# Patient Record
Sex: FEMALE | Race: WHITE | NOT HISPANIC OR LATINO
[De-identification: names, ages, dates, MRNs, and addresses within clinical notes are randomized per-mention and may not be internally consistent; named-entity substitution may affect disease eponyms.]

---

## 2022-09-21 ENCOUNTER — NON-APPOINTMENT (OUTPATIENT)
Age: 32
End: 2022-09-21

## 2022-10-07 ENCOUNTER — APPOINTMENT (OUTPATIENT)
Dept: OTOLARYNGOLOGY | Facility: CLINIC | Age: 32
End: 2022-10-07

## 2022-10-07 VITALS — TEMPERATURE: 97.3 F | BODY MASS INDEX: 28.47 KG/M2 | WEIGHT: 145 LBS | HEIGHT: 60 IN

## 2022-10-07 PROBLEM — Z00.00 ENCOUNTER FOR PREVENTIVE HEALTH EXAMINATION: Status: ACTIVE | Noted: 2022-10-07

## 2022-10-07 PROCEDURE — 31575 DIAGNOSTIC LARYNGOSCOPY: CPT

## 2022-10-07 PROCEDURE — 99204 OFFICE O/P NEW MOD 45 MIN: CPT | Mod: 25

## 2022-10-07 NOTE — PROCEDURE
[Dysphagia] : dysphagia not clearly evaluated by indirect laryngoscopy [None] : none [Flexible Endoscope] : examined with the flexible endoscope [True Vocal Cords Erythematous] : bilateral true vocal cord edema [Arytenoid Cartilages Bilateral] : bilateral arytenoid granuloma(s) [Arytenoid Edema ___ /4] : arytenoid edema [unfilled]U/4 [Arytenoid Erythema ___ /4] : arytenoid erythema [unfilled]U/4

## 2022-10-07 NOTE — PHYSICAL EXAM
[de-identified] : right tenderness  [Normal] : mucosa is normal [Midline] : trachea located in midline position [de-identified] : edema and enlarged

## 2022-10-07 NOTE — HISTORY OF PRESENT ILLNESS
[de-identified] : 32 year old woman with right sided neck swelling for several months that is tender on palpation. There has been no change in size but has gotten more tender. Pt has migraines as well. Pt had difficulty swallowing a few weeks ago and then had fever and throat pain subsequently. Pt feels her tonsils enlarged and then was given steroids with some improvement.  [Neck Mass] : neck mass [Difficulty Swallowing] : difficulty swallowing [Neck Pain] : neck pain [Fever] : no fever [Painful Swallowing] : no painful swallowing [Fatigue] : no fatigue [Night Sweats] : no night sweats

## 2022-10-09 ENCOUNTER — APPOINTMENT (OUTPATIENT)
Dept: ULTRASOUND IMAGING | Facility: HOSPITAL | Age: 32
End: 2022-10-09

## 2022-10-09 ENCOUNTER — OUTPATIENT (OUTPATIENT)
Dept: OUTPATIENT SERVICES | Facility: HOSPITAL | Age: 32
LOS: 1 days | End: 2022-10-09
Payer: COMMERCIAL

## 2022-10-09 PROCEDURE — 76536 US EXAM OF HEAD AND NECK: CPT | Mod: 26

## 2022-10-09 PROCEDURE — 76536 US EXAM OF HEAD AND NECK: CPT

## 2022-10-11 ENCOUNTER — TRANSCRIPTION ENCOUNTER (OUTPATIENT)
Age: 32
End: 2022-10-11

## 2022-10-11 LAB
ALBUMIN SERPL ELPH-MCNC: 4.8 G/DL
ALP BLD-CCNC: 59 U/L
ALT SERPL-CCNC: 32 U/L
ANION GAP SERPL CALC-SCNC: 13 MMOL/L
AST SERPL-CCNC: 23 U/L
BASOPHILS # BLD AUTO: 0.03 K/UL
BASOPHILS NFR BLD AUTO: 0.3 %
BILIRUB SERPL-MCNC: 0.2 MG/DL
BUN SERPL-MCNC: 8 MG/DL
CALCIUM SERPL-MCNC: 9.7 MG/DL
CHLORIDE SERPL-SCNC: 103 MMOL/L
CO2 SERPL-SCNC: 24 MMOL/L
CREAT SERPL-MCNC: 0.73 MG/DL
CRP SERPL-MCNC: <3 MG/L
EGFR: 113 ML/MIN/1.73M2
EOSINOPHIL # BLD AUTO: 0.06 K/UL
EOSINOPHIL NFR BLD AUTO: 0.6 %
ERYTHROCYTE [SEDIMENTATION RATE] IN BLOOD BY WESTERGREN METHOD: 18 MM/HR
GLUCOSE SERPL-MCNC: 92 MG/DL
HCT VFR BLD CALC: 38.7 %
HGB BLD-MCNC: 13.1 G/DL
IMM GRANULOCYTES NFR BLD AUTO: 0.3 %
LYMPHOCYTES # BLD AUTO: 3.1 K/UL
LYMPHOCYTES NFR BLD AUTO: 33.3 %
MAN DIFF?: NORMAL
MCHC RBC-ENTMCNC: 32.4 PG
MCHC RBC-ENTMCNC: 33.9 GM/DL
MCV RBC AUTO: 95.8 FL
MONOCYTES # BLD AUTO: 0.7 K/UL
MONOCYTES NFR BLD AUTO: 7.5 %
NEUTROPHILS # BLD AUTO: 5.4 K/UL
NEUTROPHILS NFR BLD AUTO: 58 %
PLATELET # BLD AUTO: 335 K/UL
POTASSIUM SERPL-SCNC: 4.1 MMOL/L
PROT SERPL-MCNC: 7.2 G/DL
RBC # BLD: 4.04 M/UL
RBC # FLD: 13.7 %
SODIUM SERPL-SCNC: 140 MMOL/L
WBC # FLD AUTO: 9.32 K/UL

## 2022-10-21 ENCOUNTER — APPOINTMENT (OUTPATIENT)
Dept: OTOLARYNGOLOGY | Facility: CLINIC | Age: 32
End: 2022-10-21

## 2022-10-21 VITALS
HEART RATE: 90 BPM | BODY MASS INDEX: 28.47 KG/M2 | WEIGHT: 145 LBS | TEMPERATURE: 97.8 F | HEIGHT: 60 IN | SYSTOLIC BLOOD PRESSURE: 109 MMHG | DIASTOLIC BLOOD PRESSURE: 72 MMHG

## 2022-10-21 DIAGNOSIS — R22.1 LOCALIZED SWELLING, MASS AND LUMP, NECK: ICD-10-CM

## 2022-10-21 DIAGNOSIS — R13.10 DYSPHAGIA, UNSPECIFIED: ICD-10-CM

## 2022-10-21 PROCEDURE — 99214 OFFICE O/P EST MOD 30 MIN: CPT | Mod: 25

## 2022-10-21 PROCEDURE — 31575 DIAGNOSTIC LARYNGOSCOPY: CPT

## 2022-10-21 RX ORDER — IBUPROFEN 800 MG/1
800 TABLET, FILM COATED ORAL TWICE DAILY
Qty: 20 | Refills: 3 | Status: ACTIVE | COMMUNITY
Start: 2022-10-21 | End: 1900-01-01

## 2022-10-21 NOTE — PHYSICAL EXAM
[de-identified] : neck is severely tender  [Normal] : mucosa is normal [Midline] : trachea located in midline position [de-identified] : edema and enlarged

## 2022-10-21 NOTE — HISTORY OF PRESENT ILLNESS
[FreeTextEntry1] : Pt returns with some improvement of symptoms of difficulty in swallowing. they are intermittent and patient has some nausea. Pt has evolution in cough from dry to wet. Swelling and discomfort has not changed with abx and PPI. Labs and U/S were reviewed and were WNL and shows benign cervical adenopathy. Pt has persistent right neck pain .  ESR 18.  Pt is ebv negative.

## 2022-10-21 NOTE — ASSESSMENT
[FreeTextEntry1] : Pt has continued neck pain and swelling in the deep neck on the right side.Pt has elevated ESR.

## 2022-10-25 ENCOUNTER — NON-APPOINTMENT (OUTPATIENT)
Age: 32
End: 2022-10-25

## 2022-10-28 ENCOUNTER — EMERGENCY (EMERGENCY)
Facility: HOSPITAL | Age: 32
LOS: 1 days | Discharge: HOME | End: 2022-10-28
Attending: EMERGENCY MEDICINE | Admitting: EMERGENCY MEDICINE
Payer: COMMERCIAL

## 2022-10-28 VITALS
OXYGEN SATURATION: 99 % | HEART RATE: 98 BPM | TEMPERATURE: 98 F | DIASTOLIC BLOOD PRESSURE: 78 MMHG | RESPIRATION RATE: 16 BRPM | SYSTOLIC BLOOD PRESSURE: 115 MMHG

## 2022-10-28 VITALS
RESPIRATION RATE: 18 BRPM | HEIGHT: 60 IN | OXYGEN SATURATION: 99 % | WEIGHT: 145.06 LBS | SYSTOLIC BLOOD PRESSURE: 112 MMHG | TEMPERATURE: 99 F | DIASTOLIC BLOOD PRESSURE: 75 MMHG | HEART RATE: 109 BPM

## 2022-10-28 LAB
ALBUMIN SERPL ELPH-MCNC: 4.5 G/DL — SIGNIFICANT CHANGE UP (ref 3.3–5)
ALP SERPL-CCNC: 121 U/L — HIGH (ref 40–120)
ALT FLD-CCNC: 65 U/L — HIGH (ref 10–45)
ANION GAP SERPL CALC-SCNC: 13 MMOL/L — SIGNIFICANT CHANGE UP (ref 5–17)
AST SERPL-CCNC: 54 U/L — HIGH (ref 10–40)
BASOPHILS # BLD AUTO: 0.02 K/UL — SIGNIFICANT CHANGE UP (ref 0–0.2)
BASOPHILS NFR BLD AUTO: 0.2 % — SIGNIFICANT CHANGE UP (ref 0–2)
BILIRUB SERPL-MCNC: 0.4 MG/DL — SIGNIFICANT CHANGE UP (ref 0.2–1.2)
BUN SERPL-MCNC: 7 MG/DL — SIGNIFICANT CHANGE UP (ref 7–23)
CALCIUM SERPL-MCNC: 9.8 MG/DL — SIGNIFICANT CHANGE UP (ref 8.4–10.5)
CHLORIDE SERPL-SCNC: 103 MMOL/L — SIGNIFICANT CHANGE UP (ref 96–108)
CO2 SERPL-SCNC: 23 MMOL/L — SIGNIFICANT CHANGE UP (ref 22–31)
CREAT SERPL-MCNC: 0.62 MG/DL — SIGNIFICANT CHANGE UP (ref 0.5–1.3)
EGFR: 122 ML/MIN/1.73M2 — SIGNIFICANT CHANGE UP
EOSINOPHIL # BLD AUTO: 0.03 K/UL — SIGNIFICANT CHANGE UP (ref 0–0.5)
EOSINOPHIL NFR BLD AUTO: 0.2 % — SIGNIFICANT CHANGE UP (ref 0–6)
GLUCOSE SERPL-MCNC: 96 MG/DL — SIGNIFICANT CHANGE UP (ref 70–99)
HCG SERPL-ACNC: <0 MIU/ML — SIGNIFICANT CHANGE UP
HCT VFR BLD CALC: 36.7 % — SIGNIFICANT CHANGE UP (ref 34.5–45)
HGB BLD-MCNC: 12.5 G/DL — SIGNIFICANT CHANGE UP (ref 11.5–15.5)
IMM GRANULOCYTES NFR BLD AUTO: 0.6 % — SIGNIFICANT CHANGE UP (ref 0–0.9)
LYMPHOCYTES # BLD AUTO: 1.55 K/UL — SIGNIFICANT CHANGE UP (ref 1–3.3)
LYMPHOCYTES # BLD AUTO: 11.9 % — LOW (ref 13–44)
MCHC RBC-ENTMCNC: 32 PG — SIGNIFICANT CHANGE UP (ref 27–34)
MCHC RBC-ENTMCNC: 34.1 GM/DL — SIGNIFICANT CHANGE UP (ref 32–36)
MCV RBC AUTO: 93.9 FL — SIGNIFICANT CHANGE UP (ref 80–100)
MONOCYTES # BLD AUTO: 1.01 K/UL — HIGH (ref 0–0.9)
MONOCYTES NFR BLD AUTO: 7.8 % — SIGNIFICANT CHANGE UP (ref 2–14)
NEUTROPHILS # BLD AUTO: 10.29 K/UL — HIGH (ref 1.8–7.4)
NEUTROPHILS NFR BLD AUTO: 79.3 % — HIGH (ref 43–77)
NRBC # BLD: 0 /100 WBCS — SIGNIFICANT CHANGE UP (ref 0–0)
PLATELET # BLD AUTO: 351 K/UL — SIGNIFICANT CHANGE UP (ref 150–400)
POTASSIUM SERPL-MCNC: 4.1 MMOL/L — SIGNIFICANT CHANGE UP (ref 3.5–5.3)
POTASSIUM SERPL-SCNC: 4.1 MMOL/L — SIGNIFICANT CHANGE UP (ref 3.5–5.3)
PROT SERPL-MCNC: 8.6 G/DL — HIGH (ref 6–8.3)
RBC # BLD: 3.91 M/UL — SIGNIFICANT CHANGE UP (ref 3.8–5.2)
RBC # FLD: 13.2 % — SIGNIFICANT CHANGE UP (ref 10.3–14.5)
S PYO AG SPEC QL IA: NEGATIVE — SIGNIFICANT CHANGE UP
SARS-COV-2 RNA SPEC QL NAA+PROBE: NEGATIVE — SIGNIFICANT CHANGE UP
SODIUM SERPL-SCNC: 139 MMOL/L — SIGNIFICANT CHANGE UP (ref 135–145)
WBC # BLD: 12.98 K/UL — HIGH (ref 3.8–10.5)
WBC # FLD AUTO: 12.98 K/UL — HIGH (ref 3.8–10.5)

## 2022-10-28 PROCEDURE — 96365 THER/PROPH/DIAG IV INF INIT: CPT | Mod: XU

## 2022-10-28 PROCEDURE — 70491 CT SOFT TISSUE NECK W/DYE: CPT | Mod: 26,MA

## 2022-10-28 PROCEDURE — 87635 SARS-COV-2 COVID-19 AMP PRB: CPT

## 2022-10-28 PROCEDURE — 99285 EMERGENCY DEPT VISIT HI MDM: CPT

## 2022-10-28 PROCEDURE — 87040 BLOOD CULTURE FOR BACTERIA: CPT

## 2022-10-28 PROCEDURE — 36415 COLL VENOUS BLD VENIPUNCTURE: CPT

## 2022-10-28 PROCEDURE — 87880 STREP A ASSAY W/OPTIC: CPT

## 2022-10-28 PROCEDURE — 96361 HYDRATE IV INFUSION ADD-ON: CPT | Mod: XU

## 2022-10-28 PROCEDURE — 85025 COMPLETE CBC W/AUTO DIFF WBC: CPT

## 2022-10-28 PROCEDURE — 84702 CHORIONIC GONADOTROPIN TEST: CPT

## 2022-10-28 PROCEDURE — 99285 EMERGENCY DEPT VISIT HI MDM: CPT | Mod: 25

## 2022-10-28 PROCEDURE — 31575 DIAGNOSTIC LARYNGOSCOPY: CPT

## 2022-10-28 PROCEDURE — 96375 TX/PRO/DX INJ NEW DRUG ADDON: CPT | Mod: XU

## 2022-10-28 PROCEDURE — 87081 CULTURE SCREEN ONLY: CPT

## 2022-10-28 PROCEDURE — 80053 COMPREHEN METABOLIC PANEL: CPT

## 2022-10-28 PROCEDURE — 70491 CT SOFT TISSUE NECK W/DYE: CPT | Mod: MA

## 2022-10-28 RX ORDER — DEXAMETHASONE 0.5 MG/5ML
10 ELIXIR ORAL ONCE
Refills: 0 | Status: COMPLETED | OUTPATIENT
Start: 2022-10-28 | End: 2022-10-28

## 2022-10-28 RX ORDER — SODIUM CHLORIDE 9 MG/ML
1000 INJECTION INTRAMUSCULAR; INTRAVENOUS; SUBCUTANEOUS ONCE
Refills: 0 | Status: COMPLETED | OUTPATIENT
Start: 2022-10-28 | End: 2022-10-28

## 2022-10-28 RX ORDER — AMPICILLIN SODIUM AND SULBACTAM SODIUM 250; 125 MG/ML; MG/ML
3 INJECTION, POWDER, FOR SUSPENSION INTRAMUSCULAR; INTRAVENOUS ONCE
Refills: 0 | Status: COMPLETED | OUTPATIENT
Start: 2022-10-28 | End: 2022-10-28

## 2022-10-28 RX ORDER — KETOROLAC TROMETHAMINE 30 MG/ML
15 SYRINGE (ML) INJECTION ONCE
Refills: 0 | Status: DISCONTINUED | OUTPATIENT
Start: 2022-10-28 | End: 2022-10-28

## 2022-10-28 RX ADMIN — SODIUM CHLORIDE 1000 MILLILITER(S): 9 INJECTION INTRAMUSCULAR; INTRAVENOUS; SUBCUTANEOUS at 19:25

## 2022-10-28 RX ADMIN — Medication 15 MILLIGRAM(S): at 16:51

## 2022-10-28 RX ADMIN — AMPICILLIN SODIUM AND SULBACTAM SODIUM 3 GRAM(S): 250; 125 INJECTION, POWDER, FOR SUSPENSION INTRAMUSCULAR; INTRAVENOUS at 21:10

## 2022-10-28 RX ADMIN — Medication 15 MILLIGRAM(S): at 17:22

## 2022-10-28 RX ADMIN — AMPICILLIN SODIUM AND SULBACTAM SODIUM 200 GRAM(S): 250; 125 INJECTION, POWDER, FOR SUSPENSION INTRAMUSCULAR; INTRAVENOUS at 20:37

## 2022-10-28 RX ADMIN — SODIUM CHLORIDE 1000 MILLILITER(S): 9 INJECTION INTRAMUSCULAR; INTRAVENOUS; SUBCUTANEOUS at 16:51

## 2022-10-28 RX ADMIN — Medication 102 MILLIGRAM(S): at 21:10

## 2022-10-28 NOTE — ED PROVIDER NOTE - OBJECTIVE STATEMENT
32F with a hx of ADHD and migraines who c/o right neck swelling off and on for months since last May, she was seen by Dr. Nieto from ENT and had nonspecific cervical LN on recent US. She has been treated in the past with abx and PPI and steroids with some improvement but when she gets a cold sx come back. Pt states she started feeling sick again on Monday with increased pain and swelling in right neck, associated with cough and painful swallowing and subjective fever. No cp/sob, no dizziness or syncope, no vision or speech change, no n/t/w in ext, no headache.   She was scheduled for outpt MRI by ENT but has not had this yet. 32F with a hx of ADHD and migraines who c/o right neck swelling off and on for months since last May, she was seen by Dr. Nieto from ENT and had nonspecific cervical LN on recent US. She has been treated in the past with abx and PPI and steroids with some improvement but when she gets a cold sx come back. Pt states she started feeling sick again on Monday with increased pain and swelling in right neck, associated with cough and painful swallowing and subjective fever. No cp/sob, no dizziness or syncope, no vision or speech change, no n/t/w in ext, no headache. Report recent neg strep, mono and covid test. Neg HIV test 3 weeks ago.  She was scheduled for outpt MRI by ENT but has not had this yet.

## 2022-10-28 NOTE — ED PROVIDER NOTE - CARE PLAN
Principal Discharge DX:	Neck pain   1 Principal Discharge DX:	Neck pain  Secondary Diagnosis:	Pharyngitis

## 2022-10-28 NOTE — ED PROVIDER NOTE - CLINICAL SUMMARY MEDICAL DECISION MAKING FREE TEXT BOX
32F with a hx of ADHD and migraines who c/o right neck swelling off and on for months since last May, she was seen by Dr. Nieto from ENT and had nonspecific cervical LN on recent US. She has been treated in the past with abx and PPI and steroids with some improvement but when she gets a cold sx come back. Pt states she started feeling sick again on Monday with increased pain and swelling in right neck, associated with cough and painful swallowing and subjective fever. No cp/sob, no dizziness or syncope, no vision or speech change, no n/t/w in ext, no headache.   She was scheduled for outpt MRI by ENT but has not had this yet.  VSS, airway patent however pharyngeal and tonsillar injection noted as well as right TM erythema,.   Plan for labs, IVFs, toradol, CT neck w IV contrast and ENT consult. 32F with a hx of ADHD and migraines who c/o right neck swelling off and on for months since last May, she was seen by Dr. Nieto from ENT and had nonspecific cervical LN on recent US. She has been treated in the past with abx and PPI and steroids with some improvement but when she gets a cold sx come back. Pt states she started feeling sick again on Monday with increased pain and swelling in right neck, associated with cough and painful swallowing and subjective fever. No cp/sob, no dizziness or syncope, no vision or speech change, no n/t/w in ext, no headache.   She was scheduled for outpt MRI by ENT but has not had this yet.  VSS, airway patent however pharyngeal and tonsillar injection noted as well as right TM erythema.  Plan for labs, IVFs, toradol, CT neck w IV contrast and ENT consult.    Pt seen by ent, will give abx and decadron to treat pharyngitis and tonsillitis.   Pt stable for DC with outpt f/u with Dr. Nieto for further mgmt and workup as outpt.   Return precautions discussed.

## 2022-10-28 NOTE — CONSULT NOTE ADULT - SUBJECTIVE AND OBJECTIVE BOX
HPI: 32y Female PMH ADHD, migraines who presents with sore throat and neck pain. Patient reports symptoms started on Monday with tonsillar pain and sore throat. Over the week she developed right sided neck pain and swelling that radiates to the bottom of her ear. She reports odynophagia but is tolerating PO. Denies shortness of breath, wheezing, stridor. Has mild cough and says she is phlegmy. Also reports runny nose. Reports subjective fever/chills. She reports that she has had a similar episode in September and has been feeling unwell on and off since last May. She is followed by an ENT, Dr. Nieto. She had US of right neck earlier this month which showed some benign appearing enlarged nodes. She has a pendign MRI to possibly evaluate for vasculitis?      ALLERGIES  No Known Allergies    PAST MEDICAL & SURGICAL HISTORY: per HPI    MEDICATIONS  (STANDING):     MEDICATIONS  (PRN):    REVIEW OF SYSTEMS: per HPI    LABS:  CBC-                        12.5   12.98 )-----------( 351      ( 28 Oct 2022 16:26 )             36.7     10-28    139  |  103  |  7   ----------------------------<  96  4.1   |  23  |  0.62    Ca    9.8      28 Oct 2022 16:26    TPro  8.6<H>  /  Alb  4.5  /  TBili  0.4  /  DBili  x   /  AST  54<H>  /  ALT  65<H>  /  AlkPhos  121<H>  10-28    Coagulation Studies-    Endocrine Panel-  --  --  9.8 mg/dL      Vital Signs Last 24 Hrs  T(C): 37 (28 Oct 2022 15:34), Max: 37 (28 Oct 2022 15:34)  T(F): 98.6 (28 Oct 2022 15:34), Max: 98.6 (28 Oct 2022 15:34)  HR: 109 (28 Oct 2022 15:34) (109 - 109)  BP: 112/75 (28 Oct 2022 15:34) (112/75 - 112/75)  BP(mean): --  RR: 18 (28 Oct 2022 15:34) (18 - 18)  SpO2: 99% (28 Oct 2022 15:34) (99% - 99%)    Parameters below as of 28 Oct 2022 15:34  Patient On (Oxygen Delivery Method): room air      PHYSICAL EXAM:  General: NAD, A+Ox3  Respiratory: No respiratory distress, stridor, or stertor, + cough  Voice quality: mild hoarse  Face: + R periparotid node, TTP   OU: EOMI  AD: Pinna wnl, EAC clear, TM intact, no effusion, + three small areas of tympanosclerosis  AS: Pinna wnl, EAC clear, TM intact, no effusion  Nose: + inflammed mucosa, boggy turbinates, + clear rhinorrhea  OC/OP: tongue normal, floor of mouth WNL, no masses or lesions, + R>L tonsillar enlargement, no effacement of tonsillar pillar, no swelling of soft palate, uvula midline   Neck: + right submandibular and anterior cervical chain LAD, TTP  Neuro: CNII-XII intact    LARYNGOSCOPY EXAM:   Verbal consent was obtained from patient prior to procedure.  Indication: sore throat and neck pain c/f deep neck infection  Anesthesia: none    Flexible laryngoscopy was performed and revealed the following:    Nasopharynx had no mass or exudate. + rhinorrhea, inflammed nasal mucosa, + posterior pharyngeal wall erythema    Base of tongue was symmetric and not enlarged. + sputum    Vallecula was clear    Epiglottis, both aryepiglottic folds and both false vocal folds were normal    Arytenoids both without edema and erythema     True vocal folds were fully mobile and without lesions.     Post cricoid area was clear    Interarytenoid edema was absent  The patient tolerated the procedure well.    RADIOLOGY & ADDITIONAL STUDIES:    ACC: [id number] EXAM:  CT NECK SOFT TISSUE IC                        PROCEDURE DATE:  10/28/2022    INTERPRETATION:  Technique: A thin section axial acquisition was   performed from the skull base to the thoracic inlet.  The data was   reformatted in the sagittal, coronal and axial planes.  Contrast: 90 cc Isovue-[id number]  Clinical Information: Right-sided neck pain and stiffness; recent illness  Prior Studies: Neck ultrasound 10/09/2022  Findings:  *  Aerodigestive Tract: There is mucosal hyperenhancement of   nasopharyngeal adenoids and also the palatine tonsils with striated   hyperenhancement particularly evident at the right palatine tonsil on   series 3, image 38. Appearance is consistent with hyperplasia and   inflammation; query viral or early bacterial pharyngitis and correlate   with direct inspection. No abscess. Epiglottis appears normal thickness   and piriform sinuses are not effaced without visible laryngeal edema or   mass effect. There is no retropharyngeal edema or parapharyngeal space   fat stranding.  *  Lymph Nodes: There are mildly prominent lymph nodes in the level 2   stations, none pathologically enlarged by size criteria, measuring up to   1.5 cm. A right retropharyngeal lymph node measures 8mm, slightly larger   than one on the left side. These nodes are presumably reactive and/or   inflammatory. No carlos suppuration or heterogeneity to indicate bacterial   lymphadenitis. Viral (EBV/HIV?) or atypical lymphoproliferative state can   be considered. If nodes are persistent and painful despite conservative   therapy, tissue sampling may be warranted.  *  Salivary Glands: Normal  *  Thyroid Gland: Normal  *  Orbits: Normal  *  Brain: Included portions are normal  *  Skull Base: Unremarkable  *  Paranasal Sinuses and mastoids: Scattered mucosal thickening mostly   seen in ethmoid cells and mildly seen in the sphenoid and maxillary   sinuses. Mastoids are clear  *  Cervical Spine: Straightened lordosis but otherwise unremarkable  *  Lung Apices: There is tree-in-bud groundglass opacity partially seen   in the right upper lobe (series 4, image 90) consistent with small airway   impaction.  IMPRESSION:  Hyperenhancing lymphoid tissue of Waldeyer's ring and striated   enhancement of the right palatine tonsil is consistent with inflammatory   state, correlate for pharyngitis with potential viral etiology as   discussed above.  Right upper cervical lymph nodes are likely reactive and clinical   follow-up advised. No abscess.  Focal small airway disease seen in the partially imaged right upper lung.  --- End of Report ---  MIS HYDE MD; Attending Radiologist  This document has been electronically signed. Oct 28 2022  7:39PM    ACC: [id number] EXAM:  US HEAD NECK SOFT TISSUE                        PROCEDURE DATE:  10/09/2022    INTERPRETATION:  Ultrasound of the neck  INDICATION: Right neck swelling  Ultrasound of the neck is performed with attention to the cervical lymph   nodes. Grayscale and duplex Doppler images were obtained.  Prior studies: None  FINDINGS: There is a 1.7 x 0.7 x 1.1 cm level 2 cervical lymph node with   benign morphology. There is a 1.6 x 0.7 x 1.2 cm level 1 cervical lymph   node with benign appearance. There is a 1.3 x 0.7 x 1.5 cm level 5   cervical lymph node with benign appearance. There is a 1 x 0.5 x 0.8 cm   level 2 left cervical lymph node with benign appearance.  IMPRESSION: No suspicious cervical lymph nodes are identified.  --- End of Report ---  CAROLINA FARAH MD; Attending Radiologist  This document has been electronically signed. Oct 11 2022  9:46AM    A/P:  32y Female p/w with acute pharyngitis/tonsillitis with R sided lymphadenopathy. + Leukocytosis with increased slightly neutrophils. + elevated transaminases, alkphos. Rapid strep - , Covid -    Recommendations  - Can obtain throat viral culture, HSV/CMV  - Can discharge on clindamycin  - Can follow up with Dr. Nieto  - upcoming MRI scheduled  - Symptomatic treatment - throat lozenges, plenty of hydration, OTC pain meds  - Discussed with Dr. Nieto    Thank you for the consult, please page ENT at [phone] with any questions/concerns.  ----------------------------------------------------    Department of Otolaryngology - Head and Neck Surgery  [facility]

## 2022-10-28 NOTE — ED PROVIDER NOTE - PATIENT PORTAL LINK FT
You can access the FollowMyHealth Patient Portal offered by [facility] by registering at the following website: [email or web address]. By joining [facility]’s FollowMyHealth portal, you will also be able to view your health information using other applications (apps) compatible with our system.

## 2022-10-28 NOTE — ED PROVIDER NOTE - CARE PROVIDER_API CALL
Vasu Nieto)  Otolaryngology  [address] [place] [address], [address] [place] [address]  Phone: [phone]  Fax: [phone]  Follow Up Time:

## 2022-10-28 NOTE — ED ADULT NURSE NOTE - NS ED NURSE LEVEL OF CONSCIOUSNESS AFFECT
10/22/2021      Bessy Mcclellan  [address] [place] [address] [address] [place] [address]    Dear Bessy:    In reviewing your medical records, we do not have a copy of your Advanced Directive on file.  An Advanced Directive serves as a guide for your family and physician if a situation were to arise when you could not speak for yourself.    If you need a copy of a new blank Advanced Directive, please call or visit the clinic so we can get you a copy.  An Advanced Directive is an important document to have.  I encourage you to review and discuss your wishes with your family.  The best time to complete your Advanced Directive is while your health is good and you can think clearly about your choices.     When completing this document, please make sure that you and 2 witnesses sign it.  Witnesses cannot be related to you by blood, marriage, or adoption.  They cannot be your health care agent or cannot be a health care worker.  Witnesses can be your friends, neighbors, [employer] members, bank tellers, etc.    If you have any questions or need assistance completing your Advanced Directive, please call our clinic at Dept: [phone] . When you have completed your Advanced Directive, please bring it with you to your next appointment or drop it off at our office.    If you already have an advanced directives, please send us a copy so we can update your medical record with the information.    Thank you in advance for providing your family and me this very important information.    Sincerely,     Dutch Tam,   [address] [place] [address]  [phone]  Dept: [phone]      
Calm

## 2022-10-28 NOTE — ED PROVIDER NOTE - NSFOLLOWUPINSTRUCTIONS_ED_ALL_ED_FT
1. You were seen for neck pain and pharyngitis and tonsilitis. A copy of any of your resulted labs, imaging, and findings have been provided to you. Make sure to view any test results that may not have yet resulted at the time of your discharge by creating a FollowMyHealth account at: [email or web address] to sign up for FollowMyHealth.   2. Continue to take your home medications as prescribed. Please take antibiotics as directed. Take motrin or tylenol for pain. Stay hydrated.   3. Please follow up with your primary care physician. You may call our referrals coordinator at [phone] Monday to Friday 11am-7pm for assistance with making an appointment. Or you can call [phone] to make an appointment.  4. Return to the emergency department for new, persistent, or worsening symptoms or signs, or for any concerning symptoms.   5. For your for health, you should make healthy food choices and be physically active. Also, you should not smoke or use drugs, and you should not drink alcohol in excess. Please visit [email or web address] for resources and more information.    Pharyngitis    Pharyngitis is inflammation of your pharynx, which is typically caused by a viral or bacterial infection. Pharyngitis can be contagious and may spread from person to person through intimate contact, coughing, sneezing, or sharing personal items and utensils. Symptoms of pharyngitis may include sore throat, fever, headache, or swollen lymph nodes. If you are prescribed antibiotics, make sure you finish them even if you start to feel better. Gargle with salt water as often as every 1-2 hours to soothe your throat. Throat lozenges (if you are not at risk for choking) or sprays may be used to soothe your throat.    SEEK IMMEDIATE MEDICAL CARE IF YOU HAVE ANY OF THE FOLLOWING SYMPTOMS: neck stiffness, drooling, hoarseness or change in voice, inability to swallow liquids, vomiting, or trouble breathing.

## 2022-10-28 NOTE — ED PROVIDER NOTE - PHYSICAL EXAMINATION
GEN: Well appearing, well developed, awake, alert, oriented to person, place, time/situation and in no apparent distress. NTAF  ENT: Airway patent, Nasal mucosa clear. Mouth with somewhat dry mucosa. pharyngeal and tonsillar injection. +right neck TTP with palpable LNs, no meningismus. R TM erythema.   EYES: Clear bilaterally. PERRL, EOMI  RESPIRATORY: Breathing comfortably with normal RR. No W/C/R, no hypoxia or resp distress.  CARDIAC: Regular rate and rhythm, no M/R/G  ABDOMEN: Soft, nontender, +bowel sounds, no rebound, rigidity, or guarding.  MSK: Range of motion is not limited, no deformities noted.  NEURO: Alert and oriented, no focal deficits.  SKIN: Skin normal color for race, warm, dry and intact. No evidence of rash.  PSYCH: Alert and oriented to person, place, time/situation. normal mood and affect. no apparent risk to self or others.

## 2022-10-28 NOTE — ED ADULT NURSE REASSESSMENT NOTE - NS ED NURSE REASSESS COMMENT FT1
pt sitting in chair unlabored respirations airway remains patent speaks clear full sentences abx running decadron to infuse next. pending ENT c/s

## 2022-10-28 NOTE — ED ADULT TRIAGE NOTE - CHIEF COMPLAINT QUOTE
pt states " fatuma been sick for four days with fever, cough and sore throat". pt states for several weeks she has had swelling to R side of neck which she has had several US for, which show "swollen lymph nodes". pt states she was told she may also have swelling to carotid? pt speaking in full complete sentences. seen at urgent care 2 days ago and Rx steroids which she has not taken.

## 2022-10-28 NOTE — ED ADULT NURSE NOTE - OBJECTIVE STATEMENT
pt received into spot A A&Ox45 amb appears comfortable arrives via walk in triage for eval of right sided neck pain and swollen lymph node in the neck since March seeing and ENT. Pain and swelling worse x 1 week. still speaks clear full sentences tolerates secretions airway patent no sob resp unlabored

## 2022-10-31 DIAGNOSIS — F90.9 ATTENTION-DEFICIT HYPERACTIVITY DISORDER, UNSPECIFIED TYPE: ICD-10-CM

## 2022-10-31 DIAGNOSIS — R74.01 ELEVATION OF LEVELS OF LIVER TRANSAMINASE LEVELS: ICD-10-CM

## 2022-10-31 DIAGNOSIS — D71 FUNCTIONAL DISORDERS OF POLYMORPHONUCLEAR NEUTROPHILS: ICD-10-CM

## 2022-10-31 DIAGNOSIS — R74.8 ABNORMAL LEVELS OF OTHER SERUM ENZYMES: ICD-10-CM

## 2022-10-31 DIAGNOSIS — G43.909 MIGRAINE, UNSPECIFIED, NOT INTRACTABLE, WITHOUT STATUS MIGRAINOSUS: ICD-10-CM

## 2022-10-31 DIAGNOSIS — D72.829 ELEVATED WHITE BLOOD CELL COUNT, UNSPECIFIED: ICD-10-CM

## 2022-10-31 DIAGNOSIS — J02.9 ACUTE PHARYNGITIS, UNSPECIFIED: ICD-10-CM

## 2022-10-31 DIAGNOSIS — M54.2 CERVICALGIA: ICD-10-CM

## 2022-10-31 DIAGNOSIS — R22.0 LOCALIZED SWELLING, MASS AND LUMP, HEAD: ICD-10-CM

## 2022-10-31 DIAGNOSIS — Z20.822 CONTACT WITH AND (SUSPECTED) EXPOSURE TO COVID-19: ICD-10-CM

## 2022-11-02 LAB
CULTURE RESULTS: SIGNIFICANT CHANGE UP
CULTURE RESULTS: SIGNIFICANT CHANGE UP
SPECIMEN SOURCE: SIGNIFICANT CHANGE UP
SPECIMEN SOURCE: SIGNIFICANT CHANGE UP

## 2022-11-08 ENCOUNTER — TRANSCRIPTION ENCOUNTER (OUTPATIENT)
Age: 32
End: 2022-11-08

## 2022-11-10 ENCOUNTER — RESULT REVIEW (OUTPATIENT)
Age: 32
End: 2022-11-10

## 2022-11-10 ENCOUNTER — OUTPATIENT (OUTPATIENT)
Dept: OUTPATIENT SERVICES | Facility: HOSPITAL | Age: 32
LOS: 1 days | Discharge: HOME | End: 2022-11-10

## 2022-11-10 DIAGNOSIS — R22.9 LOCALIZED SWELLING, MASS AND LUMP, UNSPECIFIED: ICD-10-CM

## 2022-11-10 DIAGNOSIS — M54.2 CERVICALGIA: ICD-10-CM

## 2022-11-10 PROCEDURE — 70543 MRI ORBT/FAC/NCK W/O &W/DYE: CPT | Mod: 26

## 2022-12-02 ENCOUNTER — APPOINTMENT (OUTPATIENT)
Dept: OTOLARYNGOLOGY | Facility: CLINIC | Age: 32
End: 2022-12-02

## 2022-12-02 VITALS
TEMPERATURE: 98.1 F | BODY MASS INDEX: 27.48 KG/M2 | HEART RATE: 105 BPM | SYSTOLIC BLOOD PRESSURE: 122 MMHG | WEIGHT: 140 LBS | DIASTOLIC BLOOD PRESSURE: 80 MMHG | OXYGEN SATURATION: 98 % | HEIGHT: 60 IN

## 2022-12-02 DIAGNOSIS — J35.01 CHRONIC TONSILLITIS: ICD-10-CM

## 2022-12-02 DIAGNOSIS — G89.29 CERVICALGIA: ICD-10-CM

## 2022-12-02 DIAGNOSIS — M54.2 CERVICALGIA: ICD-10-CM

## 2022-12-02 PROCEDURE — 99214 OFFICE O/P EST MOD 30 MIN: CPT

## 2022-12-02 RX ORDER — AMOXICILLIN 875 MG/1
875 TABLET, FILM COATED ORAL
Qty: 14 | Refills: 0 | Status: ACTIVE | COMMUNITY
Start: 2022-09-23

## 2022-12-02 RX ORDER — LISDEXAMFETAMINE DIMESYLATE 20 MG/1
20 CAPSULE ORAL
Qty: 30 | Refills: 0 | Status: ACTIVE | COMMUNITY
Start: 2022-07-01

## 2022-12-02 RX ORDER — CLINDAMYCIN HYDROCHLORIDE 300 MG/1
300 CAPSULE ORAL EVERY 6 HOURS
Qty: 56 | Refills: 0 | Status: DISCONTINUED | COMMUNITY
Start: 2022-10-07 | End: 2022-12-02

## 2022-12-02 RX ORDER — BENZONATATE 100 MG/1
100 CAPSULE ORAL
Qty: 30 | Refills: 0 | Status: ACTIVE | COMMUNITY
Start: 2022-11-08

## 2022-12-02 RX ORDER — PANTOPRAZOLE 40 MG/1
40 TABLET, DELAYED RELEASE ORAL
Qty: 20 | Refills: 3 | Status: DISCONTINUED | COMMUNITY
Start: 2022-10-07 | End: 2022-12-02

## 2022-12-02 RX ORDER — METHYLPREDNISOLONE 4 MG/1
4 TABLET ORAL
Qty: 21 | Refills: 0 | Status: ACTIVE | COMMUNITY
Start: 2022-09-23

## 2022-12-02 RX ORDER — DEXTROAMPHETAMINE SACCHARATE, AMPHETAMINE ASPARTATE, DEXTROAMPHETAMINE SULFATE AND AMPHETAMINE SULFATE 5; 5; 5; 5 MG/1; MG/1; MG/1; MG/1
20 TABLET ORAL
Qty: 60 | Refills: 0 | Status: ACTIVE | COMMUNITY
Start: 2022-08-01

## 2022-12-02 RX ORDER — METHYLPHENIDATE HYDROCHLORIDE 27 MG/1
27 TABLET, EXTENDED RELEASE ORAL
Qty: 14 | Refills: 0 | Status: ACTIVE | COMMUNITY
Start: 2022-10-25

## 2022-12-02 RX ORDER — AMOXICILLIN AND CLAVULANATE POTASSIUM 875; 125 MG/1; MG/1
875-125 TABLET, COATED ORAL
Qty: 20 | Refills: 0 | Status: ACTIVE | COMMUNITY
Start: 2022-10-28

## 2022-12-02 NOTE — DATA REVIEWED
[de-identified] : reviewed by me \par PROCEDURE DATE:  11/10/2022\par \par \par \par INTERPRETATION:  INDICATION:  Swollen lymph nodes on the right side.\par \par TECHNIQUE:  Multiplanar T1 weighted and STIR images were obtained before contrast.  Diffusion-weighted images were obtained. Following intravenous gadolinium, multiplanar T1 weighted fat suppressed images were obtained. 6.5 cc of Gadavist were administered. 1 cc were discarded.\par \par COMPARISON EXAMINATION:  Neck CT dated 10/28/2022.\par \par FINDINGS:\par AERODIGESTIVE TRACT: There is no abnormal nodular or masslike enhancement along the visualized aerodigestive tract. The palatine tonsils appear slightly prominent though decreased in size since the prior CT of 10/28/2022. The previously noted adenoidal and lingual tonsillar soft tissue prominence is also decreased.\par LYMPH NODES:  A single enlarged right level 2A lymph node is noted measuring up to 1.7 cm which appears decreased in prominence since the prior CT.\par PAROTID GLANDS:  Normal.\par SUBMANDIBULAR GLANDS:  Normal.\par THYROID GLAND:  Normal.\par BONES:  Normal.\par SINONASAL CAVITY: Normal.\par MISCELLANEOUS:  None.\par \par IMPRESSION:\par \par Since recent prior CT there is decreased size of the adenoidal, palatine and lingual tonsillar soft tissue as well as decreased prominence previously noted prominent right level 2A lymph nodes.\par \par Otherwise unremarkable contrast examination of the neck.\par \par

## 2022-12-02 NOTE — PHYSICAL EXAM
[Midline] : trachea located in midline position [Normal] : palatine tonsils are normal [de-identified] : r

## 2023-02-12 ENCOUNTER — NON-APPOINTMENT (OUTPATIENT)
Age: 33
End: 2023-02-12

## 2023-02-26 ENCOUNTER — NON-APPOINTMENT (OUTPATIENT)
Age: 33
End: 2023-02-26

## 2023-03-14 ENCOUNTER — EMERGENCY (EMERGENCY)
Facility: HOSPITAL | Age: 33
LOS: 1 days | Discharge: HOME | End: 2023-03-14
Attending: EMERGENCY MEDICINE | Admitting: EMERGENCY MEDICINE
Payer: COMMERCIAL

## 2023-03-14 VITALS
OXYGEN SATURATION: 99 % | DIASTOLIC BLOOD PRESSURE: 78 MMHG | HEART RATE: 98 BPM | SYSTOLIC BLOOD PRESSURE: 107 MMHG | HEIGHT: 60 IN | TEMPERATURE: 98 F | RESPIRATION RATE: 16 BRPM | WEIGHT: 139.99 LBS

## 2023-03-14 LAB
ALBUMIN SERPL ELPH-MCNC: 4.5 G/DL — SIGNIFICANT CHANGE UP (ref 3.3–5)
ALP SERPL-CCNC: 70 U/L — SIGNIFICANT CHANGE UP (ref 40–120)
ALT FLD-CCNC: 21 U/L — SIGNIFICANT CHANGE UP (ref 10–45)
ANION GAP SERPL CALC-SCNC: 12 MMOL/L — SIGNIFICANT CHANGE UP (ref 5–17)
AST SERPL-CCNC: 21 U/L — SIGNIFICANT CHANGE UP (ref 10–40)
BASOPHILS # BLD AUTO: 0.03 K/UL — SIGNIFICANT CHANGE UP (ref 0–0.2)
BASOPHILS NFR BLD AUTO: 0.4 % — SIGNIFICANT CHANGE UP (ref 0–2)
BILIRUB SERPL-MCNC: 0.2 MG/DL — SIGNIFICANT CHANGE UP (ref 0.2–1.2)
BUN SERPL-MCNC: 11 MG/DL — SIGNIFICANT CHANGE UP (ref 7–23)
CALCIUM SERPL-MCNC: 9.8 MG/DL — SIGNIFICANT CHANGE UP (ref 8.4–10.5)
CHLORIDE SERPL-SCNC: 102 MMOL/L — SIGNIFICANT CHANGE UP (ref 96–108)
CO2 SERPL-SCNC: 23 MMOL/L — SIGNIFICANT CHANGE UP (ref 22–31)
CREAT SERPL-MCNC: 0.69 MG/DL — SIGNIFICANT CHANGE UP (ref 0.5–1.3)
D DIMER BLD IA.RAPID-MCNC: <150 NG/ML DDU — SIGNIFICANT CHANGE UP
EGFR: 119 ML/MIN/1.73M2 — SIGNIFICANT CHANGE UP
EOSINOPHIL # BLD AUTO: 0.06 K/UL — SIGNIFICANT CHANGE UP (ref 0–0.5)
EOSINOPHIL NFR BLD AUTO: 0.7 % — SIGNIFICANT CHANGE UP (ref 0–6)
GLUCOSE SERPL-MCNC: 96 MG/DL — SIGNIFICANT CHANGE UP (ref 70–99)
HCG SERPL-ACNC: <0 MIU/ML — SIGNIFICANT CHANGE UP
HCT VFR BLD CALC: 37.1 % — SIGNIFICANT CHANGE UP (ref 34.5–45)
HGB BLD-MCNC: 12.8 G/DL — SIGNIFICANT CHANGE UP (ref 11.5–15.5)
IMM GRANULOCYTES NFR BLD AUTO: 0.2 % — SIGNIFICANT CHANGE UP (ref 0–0.9)
LIDOCAIN IGE QN: 61 U/L — HIGH (ref 7–60)
LYMPHOCYTES # BLD AUTO: 3.15 K/UL — SIGNIFICANT CHANGE UP (ref 1–3.3)
LYMPHOCYTES # BLD AUTO: 38.9 % — SIGNIFICANT CHANGE UP (ref 13–44)
MCHC RBC-ENTMCNC: 31.6 PG — SIGNIFICANT CHANGE UP (ref 27–34)
MCHC RBC-ENTMCNC: 34.5 GM/DL — SIGNIFICANT CHANGE UP (ref 32–36)
MCV RBC AUTO: 91.6 FL — SIGNIFICANT CHANGE UP (ref 80–100)
MONOCYTES # BLD AUTO: 0.75 K/UL — SIGNIFICANT CHANGE UP (ref 0–0.9)
MONOCYTES NFR BLD AUTO: 9.3 % — SIGNIFICANT CHANGE UP (ref 2–14)
NEUTROPHILS # BLD AUTO: 4.08 K/UL — SIGNIFICANT CHANGE UP (ref 1.8–7.4)
NEUTROPHILS NFR BLD AUTO: 50.5 % — SIGNIFICANT CHANGE UP (ref 43–77)
NRBC # BLD: 0 /100 WBCS — SIGNIFICANT CHANGE UP (ref 0–0)
NT-PROBNP SERPL-SCNC: 6 PG/ML — SIGNIFICANT CHANGE UP (ref 0–300)
PLATELET # BLD AUTO: 378 K/UL — SIGNIFICANT CHANGE UP (ref 150–400)
POTASSIUM SERPL-MCNC: 3.7 MMOL/L — SIGNIFICANT CHANGE UP (ref 3.5–5.3)
POTASSIUM SERPL-SCNC: 3.7 MMOL/L — SIGNIFICANT CHANGE UP (ref 3.5–5.3)
PROT SERPL-MCNC: 7.5 G/DL — SIGNIFICANT CHANGE UP (ref 6–8.3)
RBC # BLD: 4.05 M/UL — SIGNIFICANT CHANGE UP (ref 3.8–5.2)
RBC # FLD: 13.5 % — SIGNIFICANT CHANGE UP (ref 10.3–14.5)
SARS-COV-2 RNA SPEC QL NAA+PROBE: SIGNIFICANT CHANGE UP
SODIUM SERPL-SCNC: 137 MMOL/L — SIGNIFICANT CHANGE UP (ref 135–145)
TROPONIN T SERPL-MCNC: 0.01 NG/ML — SIGNIFICANT CHANGE UP (ref 0–0.01)
WBC # BLD: 8.09 K/UL — SIGNIFICANT CHANGE UP (ref 3.8–10.5)
WBC # FLD AUTO: 8.09 K/UL — SIGNIFICANT CHANGE UP (ref 3.8–10.5)

## 2023-03-14 PROCEDURE — 71046 X-RAY EXAM CHEST 2 VIEWS: CPT

## 2023-03-14 PROCEDURE — 71046 X-RAY EXAM CHEST 2 VIEWS: CPT | Mod: 26

## 2023-03-14 PROCEDURE — 84484 ASSAY OF TROPONIN QUANT: CPT

## 2023-03-14 PROCEDURE — 96374 THER/PROPH/DIAG INJ IV PUSH: CPT

## 2023-03-14 PROCEDURE — 99284 EMERGENCY DEPT VISIT MOD MDM: CPT | Mod: 25

## 2023-03-14 PROCEDURE — 83880 ASSAY OF NATRIURETIC PEPTIDE: CPT

## 2023-03-14 PROCEDURE — U0005: CPT

## 2023-03-14 PROCEDURE — 80053 COMPREHEN METABOLIC PANEL: CPT

## 2023-03-14 PROCEDURE — 84702 CHORIONIC GONADOTROPIN TEST: CPT

## 2023-03-14 PROCEDURE — 85025 COMPLETE CBC W/AUTO DIFF WBC: CPT

## 2023-03-14 PROCEDURE — 99285 EMERGENCY DEPT VISIT HI MDM: CPT

## 2023-03-14 PROCEDURE — 36415 COLL VENOUS BLD VENIPUNCTURE: CPT

## 2023-03-14 PROCEDURE — 85379 FIBRIN DEGRADATION QUANT: CPT

## 2023-03-14 PROCEDURE — U0003: CPT

## 2023-03-14 PROCEDURE — 83690 ASSAY OF LIPASE: CPT

## 2023-03-14 RX ORDER — PANTOPRAZOLE SODIUM 20 MG/1
40 TABLET, DELAYED RELEASE ORAL ONCE
Refills: 0 | Status: COMPLETED | OUTPATIENT
Start: 2023-03-14 | End: 2023-03-14

## 2023-03-14 RX ORDER — ACETAMINOPHEN 500 MG
650 TABLET ORAL ONCE
Refills: 0 | Status: COMPLETED | OUTPATIENT
Start: 2023-03-14 | End: 2023-03-14

## 2023-03-14 RX ORDER — SODIUM CHLORIDE 9 MG/ML
1000 INJECTION INTRAMUSCULAR; INTRAVENOUS; SUBCUTANEOUS ONCE
Refills: 0 | Status: COMPLETED | OUTPATIENT
Start: 2023-03-14 | End: 2023-03-14

## 2023-03-14 RX ADMIN — SODIUM CHLORIDE 1000 MILLILITER(S): 9 INJECTION INTRAMUSCULAR; INTRAVENOUS; SUBCUTANEOUS at 16:29

## 2023-03-14 RX ADMIN — Medication 650 MILLIGRAM(S): at 16:30

## 2023-03-14 RX ADMIN — PANTOPRAZOLE SODIUM 40 MILLIGRAM(S): 20 TABLET, DELAYED RELEASE ORAL at 16:30

## 2023-03-14 NOTE — ED PROVIDER NOTE - CLINICAL SUMMARY MEDICAL DECISION MAKING FREE TEXT BOX
32F PMH ADHD, migraines, anxiety/depression p/w several complaints. Main concern is LUQ/L lower CP, began last night. Since then has been intermittent and sometimes feels it on the R lower chest/RUQ region. Not worse w/ deep breaths. Had chills/nausea today after eating, now resolved. Today was also feeling mild SOB. Pt also notes months of swollen tonsils and neck lymph nodes. Also months mild rhinorrhea/cough/odynophagia. States she had MRI/CT of neck and was told she has swollen lymph nodes but everything else was ok. States that during this w/u she was told her blood work was also abnormal but she is unsure what the abnormality was. No other systemic symptoms.   Vitals wnl, exam as above.  ddx: Possible GERD/gastritis/PUD vs. low suspicion PE.   Labs, CXR, IVF/symptom control, reassess.

## 2023-03-14 NOTE — ED ADULT TRIAGE NOTE - OTHER COMPLAINTS
Patient reports LUQ pain started last night and today alternates between LUQ and RUQ. Patient also reports chills, nausea and SOB. Denies chest pain.

## 2023-03-14 NOTE — ED PROVIDER NOTE - NSFOLLOWUPINSTRUCTIONS_ED_ALL_ED_FT
Can take tylenol 650mg every 6hrs as needed for pain.    Can take over the counter pepcid 20mg once or twice a day. OR can take over the counter prilosec.     Follow up with your primary doctor within 1-2 days.     Return for persistent fever, persistent vomit, worsening pain, difficulty breathing, worsening lightheaded.    Follow up with gastroenterologist for persistent symptoms. Can call [phone] to schedule appointment.     SEEK IMMEDIATE MEDICAL CARE IF YOU HAVE ANY OF THE FOLLOWING SYMPTOMS: worsening abdominal pain, uncontrollable vomiting, profuse diarrhea, inability to have bowel movements or pass gas, black or bloody stools, fever accompanying chest pain or back pain, or fainting. These symptoms may represent a serious problem that is an emergency. Do not wait to see if the symptoms will go away. Get medical help right away. Call 911 and do not drive yourself to the hospital.    Abdominal Pain, Adult    Abdominal pain can be caused by many things. Often, abdominal pain is not serious and it gets better with no treatment or by being treated at home. However, sometimes abdominal pain is serious. Your health care provider will do a medical history and a physical exam to try to determine the cause of your abdominal pain.    Follow these instructions at home:  Take over-the-counter and prescription medicines only as told by your health care provider. Do not take a laxative unless told by your health care provider.  Drink enough fluid to keep your urine clear or pale yellow.  Watch your condition for any changes.  Keep all follow-up visits as told by your health care provider. This is important.    Contact a health care provider if:  Your abdominal pain changes or gets worse.  You are not hungry or you lose weight without trying.  You are constipated or have diarrhea for more than 2–3 days.  You have pain when you urinate or have a bowel movement.  Your abdominal pain wakes you up at night.  Your pain gets worse with meals, after eating, or with certain foods.  You are throwing up and cannot keep anything down.  You have a fever.    Get help right away if:  Your pain does not go away as soon as your health care provider told you to expect.  You cannot stop throwing up.  Your pain is only in areas of the abdomen, such as the right side or the left lower portion of the abdomen.  You have bloody or black stools, or stools that look like tar.  You have severe pain, cramping, or bloating in your abdomen.  You have signs of dehydration, such as:  Dark urine, very little urine, or no urine.  Cracked lips.  Dry mouth.  Sunken eyes.  Sleepiness.  Weakness.

## 2023-03-14 NOTE — ED PROVIDER NOTE - OBJECTIVE STATEMENT
32F PMH ADHD, migraines, anxiety/depression p/w several complaints. Main concern is LUQ/L lower CP, began last night. Since then has been intermittent and sometimes feels it on the R lower chest/RUQ region. Not worse w/ deep breaths. Had chills/nausea today after eating, now resolved. Today was also feeling mild SOB. Pt also notes months of swollen tonsils and neck lymph nodes. Also months mild rhinorrhea/cough/odynophagia. States she had MRI/CT of neck and was told she has swollen lymph nodes but everything else was ok. States that during this w/u she was told her blood work was also abnormal but she is unsure what the abnormality was. No other systemic symptoms.   Denies associated vomiting, diarrhea, lightheaded, diaphoresis, palpitations, black stool, bloody stool, LE pain, LE swelling, focal weakness/numbness, recent travel/immobilization, abd pain, urinary complaints, fevers. No hormone use. No FMH CAD/clots/sudden death.   meds: fluoxetine, concerta

## 2023-03-14 NOTE — ED ADULT NURSE NOTE - OBJECTIVE STATEMENT
31 y/o female c/o abdominal RUQ pain radiate to abdominal LUQ  w/ SOB since yesterday which has worsened today, pt denies n/v/d recent fever.

## 2023-03-14 NOTE — ED PROVIDER NOTE - PHYSICAL EXAMINATION
no LE edema, normal equal distal pulses, steady unassisted gait.  minimal b/l tonsillar hypertrophy. No tonsillar exudates, erythema. No obvious LAD. No trismus. No stridor/drooling, neck FROM. Normal sounding voice. Uvula midline. No facial swelling.

## 2023-03-14 NOTE — ED PROVIDER NOTE - PROGRESS NOTE DETAILS
[place]: Lipase 61, other labs including d-dimer grossly wnl. CXR wnl. Feeling much better. Discussed importance of outpt follow up and return precautions. Clinically no indication for further emergent ED workup or hospitalization at this time. Stable for dc, outpt f/u.

## 2023-03-14 NOTE — ED PROVIDER NOTE - IV ALTEPLASE ADMIN OUTSIDE HIDDEN
show Topical Clindamycin Counseling: Patient counseled that this medication may cause skin irritation or allergic reactions.  In the event of skin irritation, the patient was advised to reduce the amount of the drug applied or use it less frequently.   The patient verbalized understanding of the proper use and possible adverse effects of clindamycin.  All of the patient's questions and concerns were addressed.

## 2023-03-14 NOTE — ED PROVIDER NOTE - PATIENT PORTAL LINK FT
You can access the FollowMyHealth Patient Portal offered by [facility] by registering at the following website: [email or web address]. By joining [facility]’s FollowMyHealth portal, you will also be able to view your health information using other applications (apps) compatible with our system.

## 2023-03-16 DIAGNOSIS — R10.9 UNSPECIFIED ABDOMINAL PAIN: ICD-10-CM

## 2023-03-16 DIAGNOSIS — R07.89 OTHER CHEST PAIN: ICD-10-CM

## 2023-03-16 DIAGNOSIS — R59.9 ENLARGED LYMPH NODES, UNSPECIFIED: ICD-10-CM

## 2023-03-16 DIAGNOSIS — R05.9 COUGH, UNSPECIFIED: ICD-10-CM

## 2023-03-16 DIAGNOSIS — R06.02 SHORTNESS OF BREATH: ICD-10-CM

## 2023-03-16 DIAGNOSIS — G43.909 MIGRAINE, UNSPECIFIED, NOT INTRACTABLE, WITHOUT STATUS MIGRAINOSUS: ICD-10-CM

## 2023-03-16 DIAGNOSIS — F90.9 ATTENTION-DEFICIT HYPERACTIVITY DISORDER, UNSPECIFIED TYPE: ICD-10-CM

## 2023-03-16 DIAGNOSIS — R10.12 LEFT UPPER QUADRANT PAIN: ICD-10-CM

## 2023-03-16 DIAGNOSIS — F41.8 OTHER SPECIFIED ANXIETY DISORDERS: ICD-10-CM

## 2023-03-16 DIAGNOSIS — J34.89 OTHER SPECIFIED DISORDERS OF NOSE AND NASAL SINUSES: ICD-10-CM

## 2023-03-16 DIAGNOSIS — R10.11 RIGHT UPPER QUADRANT PAIN: ICD-10-CM

## 2023-03-16 DIAGNOSIS — Z20.822 CONTACT WITH AND (SUSPECTED) EXPOSURE TO COVID-19: ICD-10-CM

## 2024-11-22 NOTE — HISTORY OF PRESENT ILLNESS
[de-identified] : Pt returns with some improvement of symptoms of difficulty in swallowing. they are intermittent and patient has some nausea. Pt has evolution in cough from dry to wet. Swelling and discomfort has not changed with abx and PPI. Labs and U/S were reviewed and were WNL and shows benign cervical adenopathy. Pt has persistent right neck pain. ESR 18. Pt is ebv negative.  [FreeTextEntry1] : 12/2/22 Patient presents with recurrent right sided neck pain and sensation of swollen tonsils. Her symptoms are more significant in the AM and are intermittent throughout the day. New symptoms include right sided chest ache and pressure between right shoulder/elbow. No difficulty swallowing. No voice changes. No breathing difficulties. She has had a sleep study in the past which was negative. No fevers, night sweats, weight loss or referred otalgia.  x1 loose

## 2025-05-08 ENCOUNTER — NON-APPOINTMENT (OUTPATIENT)
Age: 35
End: 2025-05-08

## 2025-05-21 ENCOUNTER — NON-APPOINTMENT (OUTPATIENT)
Age: 35
End: 2025-05-21

## 2025-05-23 ENCOUNTER — APPOINTMENT (OUTPATIENT)
Dept: ULTRASOUND IMAGING | Facility: CLINIC | Age: 35
End: 2025-05-23

## 2025-05-23 ENCOUNTER — OUTPATIENT (OUTPATIENT)
Dept: OUTPATIENT SERVICES | Facility: HOSPITAL | Age: 35
LOS: 1 days | End: 2025-05-23

## 2025-05-23 ENCOUNTER — APPOINTMENT (OUTPATIENT)
Dept: ULTRASOUND IMAGING | Facility: CLINIC | Age: 35
End: 2025-05-23
Payer: MEDICAID

## 2025-05-23 PROCEDURE — 76700 US EXAM ABDOM COMPLETE: CPT | Mod: 26

## 2025-05-23 PROCEDURE — 76856 US EXAM PELVIC COMPLETE: CPT | Mod: 26
